# Patient Record
Sex: FEMALE | Race: WHITE | NOT HISPANIC OR LATINO | Employment: UNEMPLOYED | URBAN - METROPOLITAN AREA
[De-identification: names, ages, dates, MRNs, and addresses within clinical notes are randomized per-mention and may not be internally consistent; named-entity substitution may affect disease eponyms.]

---

## 2017-06-19 ENCOUNTER — OFFICE VISIT - RIVER FALLS (OUTPATIENT)
Dept: FAMILY MEDICINE | Facility: CLINIC | Age: 62
End: 2017-06-19

## 2022-02-11 VITALS
BODY MASS INDEX: 31.91 KG/M2 | WEIGHT: 188.8 LBS | HEART RATE: 60 BPM | SYSTOLIC BLOOD PRESSURE: 140 MMHG | TEMPERATURE: 98.4 F | DIASTOLIC BLOOD PRESSURE: 94 MMHG

## 2022-02-16 NOTE — PROGRESS NOTES
Patient:   ANURAG BOOGIE            MRN: [id number]            FIN: [id number]               Age:   61 years     Sex:  Female     :  1955   Associated Diagnoses:   Cat bite   Author:   Trevon Greene PA-C      Visit Information      Date of Service: 2017 01:34 pm  Performing Location: [facility]  Encounter#: [id number]      Primary Care Provider (PCP):  Trevon Greene PA-C    NPI# [id number]   Visit type:  General concerns.    Accompanied by:  No one.    Source of history:  Self.    Referral source:  Not self.    History limitation:  None.       Chief Complaint   2017 1:36 PM CDT    Cat bite her yesterday morning- Left hand swollen, painful and red  (Modified)       History of Present Illness             The patient presents with hand pain.  The location of pain is the left.  The pain is described as aching and throbbing.  The severity of the pain is moderate.  The timing/course of the pain is episodic, fluctuates in intensity and is worsening.  The pain has lasted for 1 day(s).  Bite by own cat. Exclusively indoor cat. Startled the cat. Tetanus up to date. CC above noted and confirmed with the patient. Swelling, warmth and erythema to left hand. Puncture on thumb. .        Review of Systems   Constitutional:  Negative.    Musculoskeletal:  Negative except as documented in history of present illness.    Integumentary:  Negative except as documented in history of present illness.    Neurologic:  Negative.       Health Status   Allergies:    Allergic Reactions (All)  No known allergies      Histories   Past Medical History:    No active or resolved past medical history items have been selected or recorded.   Family History:    No family history items have been selected or recorded.   Procedure history:    No active procedure history items have been selected or recorded.      Physical Examination   Vital Signs   2017 1:36 PM CDT Temperature Tympanic 98.4 DegF    Peripheral Pulse Rate  60 bpm    Pulse Site Radial artery    HR Method Manual    Systolic Blood Pressure 140 mmHg    Diastolic Blood Pressure 94 mmHg  HI    Mean Arterial Pressure 109 mmHg    BP Site Left arm    BP Method Manual      Measurements from flowsheet : Measurements   6/19/2017 1:36 PM CDT    Weight Measured - Standard                188.8 lb     Cardiovascular:          Arterial pulses: Left, Radial, 2+.         Capillary refill: Left, Upper extremity, Within normal limits.    Musculoskeletal:  Normal range of motion, Normal strength, Swelling of left thumb and lateral hand. Vickey pen around area of erythema. Warm to touch. No drainage..    Neurologic:  No focal deficits.       Impression and Plan   Diagnosis     Cat bite ([id number]).     Patient Instructions:       Counseled: Patient, Regarding diagnosis, Regarding medications, Activity, Verbalized understanding.    Orders     Orders   Pharmacy:  Augmentin 875 mg oral tablet (Prescribe): 1 tab(s), PO, q12hr, x 10 day(s), # 20 tab(s), 0 Refill(s), Type: Acute, Pharmacy: [id number], 1 tab(s) po q12 hrs,x10 day(s).     Local cares. Probiotic. Should not extend beyond line by tomorrow or will call and will get into hand ortho.

## 2022-04-04 ENCOUNTER — OFFICE VISIT (OUTPATIENT)
Dept: FAMILY MEDICINE | Facility: CLINIC | Age: 67
End: 2022-04-04
Payer: COMMERCIAL

## 2022-04-04 VITALS
SYSTOLIC BLOOD PRESSURE: 144 MMHG | DIASTOLIC BLOOD PRESSURE: 78 MMHG | OXYGEN SATURATION: 97 % | HEIGHT: 64 IN | TEMPERATURE: 98.2 F | WEIGHT: 204.6 LBS | BODY MASS INDEX: 34.93 KG/M2 | HEART RATE: 78 BPM

## 2022-04-04 DIAGNOSIS — Z12.11 COLON CANCER SCREENING: ICD-10-CM

## 2022-04-04 DIAGNOSIS — R63.5 WEIGHT GAIN: ICD-10-CM

## 2022-04-04 DIAGNOSIS — Z00.00 WELCOME TO MEDICARE PREVENTIVE VISIT: Primary | ICD-10-CM

## 2022-04-04 DIAGNOSIS — Z13.1 SCREENING FOR DIABETES MELLITUS: ICD-10-CM

## 2022-04-04 DIAGNOSIS — Z13.220 LIPID SCREENING: ICD-10-CM

## 2022-04-04 DIAGNOSIS — Z78.0 ASYMPTOMATIC POSTMENOPAUSAL STATUS: ICD-10-CM

## 2022-04-04 DIAGNOSIS — Z12.31 ENCOUNTER FOR SCREENING MAMMOGRAM FOR BREAST CANCER: ICD-10-CM

## 2022-04-04 PROCEDURE — G0402 INITIAL PREVENTIVE EXAM: HCPCS | Performed by: NURSE PRACTITIONER

## 2022-04-04 ASSESSMENT — ACTIVITIES OF DAILY LIVING (ADL): CURRENT_FUNCTION: NO ASSISTANCE NEEDED

## 2022-04-04 NOTE — PROGRESS NOTES
"SUBJECTIVE:   Mary Carmen Villalpando is a 66 year old female who presents for Preventive Visit.    {Patient has been advised of split billing requirements and indicates understanding: Yes  Are you in the first 12 months of your Medicare coverage?  Yes,  Visual Acuity:  Right Eye: 20/20   Left Eye: 20/20  Both Eyes: 20/15    Healthy Habits:     In general, how would you rate your overall health?  Good    Frequency of exercise:  None    Do you usually eat at least 4 servings of fruit and vegetables a day, include whole grains    & fiber and avoid regularly eating high fat or \"junk\" foods?  No    Taking medications regularly:  Yes    Medication side effects:  None    Ability to successfully perform activities of daily living:  No assistance needed    Home Safety:  No safety concerns identified    Hearing Impairment:  No hearing concerns    In the past 6 months, have you been bothered by leaking of urine? Yes    In general, how would you rate your overall mental or emotional health?  Good      PHQ-2 Total Score: 0    Additional concerns today:  No    Do you feel safe in your environment? Yes    Have you ever done Advance Care Planning? (For example, a Health Directive, POLST, or a discussion with a medical provider or your loved ones about your wishes): No, advance care planning information given to patient to review.  Advanced care planning was discussed at today's visit.       Fall risk  Fallen 2 or more times in the past year?: No  Any fall with injury in the past year?: No    Cognitive Screening   1) Repeat 3 items (Leader, Season, Table)    2) Clock draw: NORMAL  3) 3 item recall: Recalls 3 objects  Results: 3 items recalled: COGNITIVE IMPAIRMENT LESS LIKELY    Mini-CogTM Copyright ROGELIO Gautam. Licensed by the author for use in [facility]; reprinted with permission ([email or web address]). All rights reserved.      Do you have sleep apnea, excessive snoring or daytime drowsiness?: no    Reviewed and updated as needed this " "visit by clinical staff   Tobacco  Allergies               Reviewed and updated as needed this visit by Provider                 Social History     Tobacco Use     Smoking status: Former Smoker     Packs/day: 1.00     Years: 40.00     Pack years: 40.00     Types: Cigarettes     Smokeless tobacco: Never Used   Substance Use Topics     Alcohol use: Yes     Comment: Occasionally     If you drink alcohol do you typically have >3 drinks per day or >7 drinks per week? No    Alcohol Use 4/4/2022   Prescreen: >3 drinks/day or >7 drinks/week? No               Current providers sharing in care for this patient include: no other providers other than those listed  Patient Care Team:  Trevon Greene PA as PCP - General (Family Medicine)  Elan Hunt MD    The following health maintenance items are reviewed in Epic and correct as of today:  There are no preventive care reminders to display for this patient.    Declines all vaccines, counseled on options and where they are avail    FHS-7:   Breast CA Risk Assessment (FHS-7) 4/4/2022   Did any of your first-degree relatives have breast or ovarian cancer? Yes   Did any of your relatives have bilateral breast cancer? Unknown   Did any man in your family have breast cancer? No   Did any woman in your family have breast and ovarian cancer? No   Did any woman in your family have breast cancer before age 50 y? No   Do you have 2 or more relatives with breast and/or ovarian cancer? Yes   Do you have 2 or more relatives with breast and/or bowel cancer? No     Mom and mom's mom with breast cancer, Mary Carmen agrees to schedule a mammogram  as above  Pertinent mammograms are reviewed under the imaging tab.    Review of Systems      OBJECTIVE:   BP (!) 144/78 (BP Location: Right arm, Patient Position: Sitting)   Pulse 78   Temp 98.2  F (36.8  C)   Ht 1.619 m (5' 3.75\")   Wt 92.8 kg (204 lb 9.6 oz)   SpO2 97%   BMI 35.40 kg/m   Estimated body mass index is 35.4 kg/m  as calculated " "from the following:    Height as of this encounter: 1.619 m (5' 3.75\").    Weight as of this encounter: 92.8 kg (204 lb 9.6 oz).  Physical Exam  AOx3 NAD , pleasant          ASSESSMENT / PLAN:       ICD-10-CM    1. Welcome to Medicare preventive visit  Z00.00 DX Hip/Pelvis/Spine     *MA Screening Digital Bilateral   2. Asymptomatic postmenopausal status  Z78.0 DX Hip/Pelvis/Spine   3. Encounter for screening mammogram for breast cancer  Z12.31 *MA Screening Digital Bilateral   4. Colon cancer screening  Z12.11 COLOGUARD([employer])   5. Lipid screening  Z13.220 Lipid panel reflex to direct LDL Fasting   6. Screening for diabetes mellitus  Z13.1 Glucose   7. Weight gain  R63.5 TSH with free T4 reflex       Patient has been advised of split billing requirements and indicates understanding: Yes    COUNSELING:  Reviewed preventive health counseling, as reflected in patient instructions       Regular exercise       Healthy diet/nutrition       Vision screening       Hearing screening       Fall risk prevention       Osteoporosis prevention/bone health       Colon cancer screening    Estimated body mass index is 35.4 kg/m  as calculated from the following:    Height as of this encounter: 1.619 m (5' 3.75\").    Weight as of this encounter: 92.8 kg (204 lb 9.6 oz).        She reports that she has quit smoking. Her smoking use included cigarettes. She has a 40.00 pack-year smoking history. She has never used smokeless tobacco.  Tobacco Cessation Action Plan:   quit smoking 2013      Appropriate preventive services were discussed with this patient, including applicable screening as appropriate for cardiovascular disease, diabetes, osteopenia/osteoporosis, and glaucoma.  As appropriate for age/gender, discussed screening for colorectal cancer, prostate cancer, breast cancer, and cervical cancer. Checklist reviewing preventive services available has been given to the patient.    Reviewed patients plan of care and provided " an AVS. The Basic Care Plan (routine screening as documented in Health Maintenance) for Mary Carmen meets the Care Plan requirement. This Care Plan has been established and reviewed with the Patient.    Counseling Resources:  ATP IV Guidelines  Pooled Cohorts Equation Calculator  Breast Cancer Risk Calculator  Breast Cancer: Medication to Reduce Risk  FRAX Risk Assessment  ICSI Preventive Guidelines  Dietary Guidelines for Americans, 2010  USDA's MyPlate  ASA Prophylaxis  Lung CA Screening    Wen Jones NP  [facility]    Identified Health Risks:

## 2022-04-08 RX ORDER — CETIRIZINE HYDROCHLORIDE 10 MG/1
10 TABLET ORAL DAILY
COMMUNITY

## 2022-04-11 ENCOUNTER — TRANSFERRED RECORDS (OUTPATIENT)
Dept: HEALTH INFORMATION MANAGEMENT | Facility: CLINIC | Age: 67
End: 2022-04-11
Payer: COMMERCIAL

## 2022-04-11 DIAGNOSIS — Z78.0 ASYMPTOMATIC MENOPAUSAL STATE: ICD-10-CM

## 2022-04-11 DIAGNOSIS — Z91.89 AT RISK FOR BONE DENSITY LOSS: ICD-10-CM

## 2022-04-11 DIAGNOSIS — R29.6 RECURRENT FALLS: ICD-10-CM

## 2022-04-11 DIAGNOSIS — Z12.11 COLON CANCER SCREENING: Primary | ICD-10-CM

## 2022-04-11 DIAGNOSIS — Z12.31 ENCOUNTER FOR SCREENING MAMMOGRAM FOR BREAST CANCER: ICD-10-CM

## 2022-04-11 LAB — COLOGUARD-ABSTRACT: POSITIVE

## 2022-04-12 ENCOUNTER — LAB (OUTPATIENT)
Dept: LAB | Facility: CLINIC | Age: 67
End: 2022-04-12
Payer: COMMERCIAL

## 2022-04-12 ENCOUNTER — TELEPHONE (OUTPATIENT)
Dept: FAMILY MEDICINE | Facility: CLINIC | Age: 67
End: 2022-04-12

## 2022-04-12 DIAGNOSIS — Z13.1 SCREENING FOR DIABETES MELLITUS: ICD-10-CM

## 2022-04-12 DIAGNOSIS — R63.5 WEIGHT GAIN: ICD-10-CM

## 2022-04-12 DIAGNOSIS — Z13.220 LIPID SCREENING: ICD-10-CM

## 2022-04-12 LAB
CHOLEST SERPL-MCNC: 200 MG/DL
FASTING STATUS PATIENT QL REPORTED: YES
FASTING STATUS PATIENT QL REPORTED: YES
GLUCOSE BLD-MCNC: 97 MG/DL (ref 70–99)
HDLC SERPL-MCNC: 76 MG/DL
LDLC SERPL CALC-MCNC: 105 MG/DL
NONHDLC SERPL-MCNC: 124 MG/DL
TRIGL SERPL-MCNC: 96 MG/DL
TSH SERPL DL<=0.005 MIU/L-ACNC: 2.61 MU/L (ref 0.4–4)

## 2022-04-12 PROCEDURE — 82947 ASSAY GLUCOSE BLOOD QUANT: CPT | Mod: QW | Performed by: NURSE PRACTITIONER

## 2022-04-12 PROCEDURE — 84443 ASSAY THYROID STIM HORMONE: CPT | Performed by: NURSE PRACTITIONER

## 2022-04-12 PROCEDURE — 36415 COLL VENOUS BLD VENIPUNCTURE: CPT | Performed by: NURSE PRACTITIONER

## 2022-04-12 PROCEDURE — 80061 LIPID PANEL: CPT | Performed by: NURSE PRACTITIONER

## 2022-04-12 NOTE — TELEPHONE ENCOUNTER
Reason for call:  Order   Order or referral being requested: Order for dexascan  Reason for request: Discussed at last visit - sent to [facility] and they refused to send orders to [facility] since Pt would prefer to be seen locally.   Date needed: as soon as possible  Has the patient been seen by the PCP for this problem? YES    Additional comments:     Phone number to reach patient:  Cell number on file:    Telephone Information:   Mobile [phone]   Mobile [phone]       Best Time:  Anytime    Can we leave a detailed message on this number?  YES    Travel screening: Not Applicable

## 2022-04-18 ENCOUNTER — TELEPHONE (OUTPATIENT)
Dept: FAMILY MEDICINE | Facility: CLINIC | Age: 67
End: 2022-04-18
Payer: COMMERCIAL

## 2022-04-18 NOTE — TELEPHONE ENCOUNTER
[employer] called (Mi) wanted to inform you This patients Cologuard screening came back w/abnormal results.  FYI, they faxed in the results, but for some reason their faxes (98% of them) come back transmitting error as the pages are completely black and they are not able to be read.

## 2022-04-18 NOTE — TELEPHONE ENCOUNTER
I spoke with pt about her positive COLOGUARD test. She needs a colonoscopy. She has never had a colon oscopy or any colon cancer screening, she has no Family history.  She lives in [place] and would like the colonoscopy done at [facility]. She had a negative experience trying to get blood work drawn locally AND trying to get her mammo and bone density ordered and sent to the [facility].  PLEASE put in orders for COLONOSCOPY dx screening with positive cologuard in the comments AND screening mammo and screening DEXA, all to be sent to the [facility]. Thanks.

## 2022-04-19 NOTE — TELEPHONE ENCOUNTER
Orders faxed, Fax confirmation received. Patient notified of orders being sent and that [facility] will contact her to schedule.

## 2022-04-19 NOTE — TELEPHONE ENCOUNTER
Looks like orders are in. Could you fax it to [facility]? Pt does not want to go though [place].       ARMANDO Bowen

## 2022-05-10 ENCOUNTER — TRANSFERRED RECORDS (OUTPATIENT)
Dept: HEALTH INFORMATION MANAGEMENT | Facility: CLINIC | Age: 67
End: 2022-05-10
Payer: COMMERCIAL

## 2022-05-17 ENCOUNTER — MEDICAL CORRESPONDENCE (OUTPATIENT)
Dept: HEALTH INFORMATION MANAGEMENT | Facility: CLINIC | Age: 67
End: 2022-05-17
Payer: COMMERCIAL

## 2022-05-18 ENCOUNTER — TRANSCRIBE ORDERS (OUTPATIENT)
Dept: OTHER | Age: 67
End: 2022-05-18
Payer: COMMERCIAL

## 2022-05-18 DIAGNOSIS — H53.2 DIPLOPIA: Primary | ICD-10-CM

## 2022-05-23 ENCOUNTER — CARE COORDINATION (OUTPATIENT)
Dept: FAMILY MEDICINE | Facility: CLINIC | Age: 67
End: 2022-05-23
Payer: COMMERCIAL

## 2022-06-06 PROBLEM — C18.9 COLON CANCER (H): Status: ACTIVE | Noted: 2022-06-06

## 2023-04-22 ENCOUNTER — HEALTH MAINTENANCE LETTER (OUTPATIENT)
Age: 68
End: 2023-04-22

## 2023-06-02 ENCOUNTER — HEALTH MAINTENANCE LETTER (OUTPATIENT)
Age: 68
End: 2023-06-02

## 2024-06-21 ENCOUNTER — TRANSFERRED RECORDS (OUTPATIENT)
Dept: HEALTH INFORMATION MANAGEMENT | Facility: CLINIC | Age: 69
End: 2024-06-21

## 2024-07-09 ENCOUNTER — MEDICAL CORRESPONDENCE (OUTPATIENT)
Dept: HEALTH INFORMATION MANAGEMENT | Facility: CLINIC | Age: 69
End: 2024-07-09
Payer: COMMERCIAL

## 2024-07-09 ENCOUNTER — TRANSFERRED RECORDS (OUTPATIENT)
Dept: HEALTH INFORMATION MANAGEMENT | Facility: CLINIC | Age: 69
End: 2024-07-09
Payer: COMMERCIAL

## 2024-07-24 ENCOUNTER — TRANSCRIBE ORDERS (OUTPATIENT)
Dept: OTHER | Age: 69
End: 2024-07-24

## 2024-07-24 DIAGNOSIS — H53.2 DIPLOPIA: Primary | ICD-10-CM

## 2024-08-20 NOTE — PROGRESS NOTES
1. Tiny angle right hypertropia intermittently symptomatic     Slowly progressive diplopia for 2 years with unrevealing recent brain MRI that I reviewed.  Patient has a very small 1 prism diopter right hypertropia and poor fusional amplitudes.  This type of phenomena can result from epiretinal membrane (but OCT macula today excludes this), skew deviation (negative MRI brain and no other features on exam to suggest skew such as nystagmus / saccadic smooth pursuit / etc), or can simply be a age related phenomena attributable to cyclovertical deviation from sagging eye syndrome.  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  No suggestion on exam of thyroid eye disease (nor MRI) and no exam findings to suggest myasthenia gravis.    Patient comfortably fuses 1 BD right eye today with a large window of single binocular vision. I will prescribe this to her in a new pair of ground in prism glasses.    Plan:  -Ground in prism glasses prescription today  -Follow-up with me in 4 months to ensure no change in alignment and patient content with ground in prism glasses    Mary Carmen Villalpando is a pleasant 68 year old White female who presents to my neuro-ophthalmology clinic today having been referred by Dr. Alan for Diplopia    HPI:  68 year old female with history of colon cancer s/p resection (monitored q6 months with CT and labs), HTN, HLD, COPD and tobacco use who developed binocular diplopia while driving. [facility] who referred for evaluation of worsening diplopia.     Mary Carmen reports that about 2 years ago, she noticed oblique double vision at distance only shortly after receiving a new pair of glasses.  She reports that initially, she noticed the double vision sporadically lasting only a few minutes at a time.  This has gradually worsened over the last few months; she now has double vision daily, which lasts longer.    Initially, she  reports that turning her head left seemed to help her double vision.  She noticed it most often while driving.    She denies eyelid drooping or difficulty swallowing.  She does have some shortness of breath from borderline emphysema.  No significant eye pain or redness.  She notes some pressure headaches/eyestrain in the last few weeks.  Previously not a headachey person.  No h/o thyroid issues (normal TSH in 2022, repeated 2024 but cannot see results).    She denies h/o patching or strabismus surgery.  No family members with strabismus.    Independent historians:  Patient    Review of outside testing:  MRI Brain WO 4/29/24:  1.  No acute intracranial abnormality.   2.  Mild presumed chronic small vessel ischemic changes.   3.  Old microhemorrhages in the bilateral lentiform nuclei, nonspecific but most likely hypertensive in etiology.     MRA neck 6/21/24:      My interpretation performed today of outside testing:  I reviewed patient's prior April MR brain images and agree that there is no clear structural cause of her strabismus     Review of outside clinical notes:        Past medical history:    Patient Active Problem List   Diagnosis    Tobacco use disorder    Fx surg nck humerus-closed    Colon cancer (H)   Colon surgery s/p resection 2 years  HTN, HLD, COPD    Patient has a current medication list which includes the following prescription(s): metoprolol succinate er, ascorbic acid, calcium-magnesium-zinc, cetirizine, cholecalciferol, echinacea, multi-vitamin, and tylenol.    Family history / social history:  Patient's family history includes Breast Cancer in her maternal aunt and mother; Diabetes in her maternal uncle; Heart Disease in her maternal grandmother.     Patient  reports that she has quit smoking. Her smoking use included cigarettes. She has a 40 pack-year smoking history. She has never used smokeless tobacco. She reports current alcohol use.     Exam:  Corrected distance visual acuity was 20/20 -1  in the right eye and 20/20 -1 in the left eye. Intraocular pressure was 15 in the right eye and 13 in the left eye using ICare.  Color vision 11/11 right eye and 11/11 left eye.  Pupils isocoric with no APD.   Please see epic chart for complete exam     Anterior segment and fundus exam remarkable for age-related changes.     Sensorimotor exam showed full motility. No nystagmus.  Alignment testing showed orthophoria in all gaze positions on alternate cover testing however single Costa pauly revealed a 1 prism diopter right hyperdeviation particularly in left gaze but also present intermittently in primary gaze.  Double Costa pauly showed no significant torsion smooth pursuit was normal.    OCT macula:  In the right eye- no epiretinal membrane. Vitreomacular adherence  In the left eye- no epiretinal membrane. Vitreomacular adherence       Precharting:  Daren Jain  Resident Physician, PGY-3  Department of Ophthalmology    Jessica Appiah, OD    45 minutes were spent on the date of the encounter by me doing chart review, history and exam, documentation, and further activities as noted above    Complete documentation of historical and exam elements from today's encounter can be found in the full encounter summary report (not reduplicated in this progress note).  I personally obtained the chief complaint(s) and history of present illness.  I confirmed and edited as necessary the review of systems, past medical/surgical history, family history, social history, and examination findings as documented by others; and I examined the patient myself.  I personally reviewed the relevant tests, images, and reports as documented above.  I formulated and edited as necessary the assessment and plan and discussed the findings and management plan with the patient and family     Melvin Farnsworth MD

## 2024-08-22 ENCOUNTER — OFFICE VISIT (OUTPATIENT)
Dept: OPHTHALMOLOGY | Facility: CLINIC | Age: 69
End: 2024-08-22
Attending: OPHTHALMOLOGY
Payer: MEDICARE

## 2024-08-22 DIAGNOSIS — H35.373 EPIRETINAL MEMBRANE (ERM) OF BOTH EYES: Primary | ICD-10-CM

## 2024-08-22 DIAGNOSIS — H53.2 DIPLOPIA: ICD-10-CM

## 2024-08-22 DIAGNOSIS — H50.21 HYPERTROPIA OF RIGHT EYE: ICD-10-CM

## 2024-08-22 PROCEDURE — 92060 SENSORIMOTOR EXAMINATION: CPT | Mod: 26 | Performed by: OPHTHALMOLOGY

## 2024-08-22 PROCEDURE — 99204 OFFICE O/P NEW MOD 45 MIN: CPT | Performed by: OPHTHALMOLOGY

## 2024-08-22 PROCEDURE — G0463 HOSPITAL OUTPT CLINIC VISIT: HCPCS | Performed by: OPHTHALMOLOGY

## 2024-08-22 PROCEDURE — 92060 SENSORIMOTOR EXAMINATION: CPT | Performed by: OPHTHALMOLOGY

## 2024-08-22 PROCEDURE — 92134 CPTRZ OPH DX IMG PST SGM RTA: CPT | Performed by: OPHTHALMOLOGY

## 2024-08-22 RX ORDER — METOPROLOL SUCCINATE 25 MG/1
1 TABLET, EXTENDED RELEASE ORAL DAILY
COMMUNITY
Start: 2024-07-30

## 2024-08-22 ASSESSMENT — CONF VISUAL FIELD
OD_SUPERIOR_TEMPORAL_RESTRICTION: 0
OD_SUPERIOR_NASAL_RESTRICTION: 0
OS_INFERIOR_TEMPORAL_RESTRICTION: 0
OD_INFERIOR_TEMPORAL_RESTRICTION: 0
OD_INFERIOR_NASAL_RESTRICTION: 0
METHOD: COUNTING FINGERS
OS_NORMAL: 1
OS_SUPERIOR_NASAL_RESTRICTION: 0
OS_SUPERIOR_TEMPORAL_RESTRICTION: 0
OD_NORMAL: 1
OS_INFERIOR_NASAL_RESTRICTION: 0

## 2024-08-22 ASSESSMENT — REFRACTION_WEARINGRX
OS_CYLINDER: +0.50
OD_SPHERE: +2.25
OS_AXIS: 105
OD_ADD: +2.25
OS_SPHERE: +2.00
OD_CYLINDER: SPHERE
OS_ADD: +2.25

## 2024-08-22 ASSESSMENT — EXTERNAL EXAM - LEFT EYE: OS_EXAM: NORMAL

## 2024-08-22 ASSESSMENT — EXTERNAL EXAM - RIGHT EYE: OD_EXAM: NORMAL

## 2024-08-22 ASSESSMENT — TONOMETRY
OD_IOP_MMHG: 15
OS_IOP_MMHG: 13
IOP_METHOD: ICARE

## 2024-08-22 ASSESSMENT — VISUAL ACUITY
OD_CC+: -1
OS_CC+: -1
OS_CC: 20/20
OD_CC: 20/20
METHOD: SNELLEN - LINEAR

## 2024-08-22 ASSESSMENT — REFRACTION_FINALRX: OD_HPRISM: 1 BASE DOWN

## 2024-08-22 ASSESSMENT — SLIT LAMP EXAM - LIDS
COMMENTS: NORMAL
COMMENTS: NORMAL

## 2024-08-22 ASSESSMENT — CUP TO DISC RATIO
OD_RATIO: 0.3
OS_RATIO: 0.25

## 2024-08-22 NOTE — LETTER
2024    RE: Mary Carmen Villalpando  : 1955  MRN: [id number]    Dear Dr. Alan,    Thank you for referring your patient, Mary Carmen Villalpando, to my neuro-ophthalmology clinic recently.  After a thorough neuro-ophthalmic history and examination, I came to the following conclusions:   1. Tiny angle right hypertropia intermittently symptomatic.    Slowly progressive diplopia for 2 years with unrevealing recent brain MRI that I reviewed.  Patient has a very small 1 prism diopter right hypertropia and poor fusional amplitudes.  This type of phenomena can result from epiretinal membrane (but OCT macula today excludes this), skew deviation (negative MRI brain and no other features on exam to suggest skew such as nystagmus / saccadic smooth pursuit / etc), or can simply be a age related phenomena attributable to cyclovertical deviation from sagging eye syndrome.  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  No suggestion on exam of thyroid eye disease (nor MRI) and no exam findings to suggest myasthenia gravis.    Patient comfortably fuses 1 BD right eye today with a large window of single binocular vision. I will prescribe this to her in a new pair of ground in prism glasses.    Plan:  -Ground in prism glasses prescription today.  -Follow-up with me in 4 months to ensure no change in alignment and patient content with ground in prism glasses.    Mary Carmen Villalpando is a pleasant 68 year old White female who presents to my neuro-ophthalmology clinic today having been referred by Dr. Alan for Diplopia.    HPI:  68 year old female with history of colon cancer s/p resection (monitored q6 months with CT and labs), HTN, HLD, COPD and tobacco use who developed binocular diplopia while driving. [facility] for evaluation of worsening diplopia.     Mary Carmen reports that about 2 years ago, she noticed oblique double vision at distance only  shortly after receiving a new pair of glasses.  She reports that initially, she noticed the double vision sporadically lasting only a few minutes at a time.  This has gradually worsened over the last few months; she now has double vision daily, which lasts longer.    Initially, she reports that turning her head left seemed to help her double vision.  She noticed it most often while driving.    She denies eyelid drooping or difficulty swallowing.  She does have some shortness of breath from borderline emphysema.  No significant eye pain or redness.  She notes some pressure headaches/eyestrain in the last few weeks.  Previously not a headachey person.  No h/o thyroid issues (normal TSH in 2022, repeated 2024 but cannot see results).    She denies h/o patching or strabismus surgery.  No family members with strabismus.    Independent historians:  Patient    Review of outside testing:  MRI Brain WO 4/29/24:  1.  No acute intracranial abnormality.   2.  Mild presumed chronic small vessel ischemic changes.   3.  Old microhemorrhages in the bilateral lentiform nuclei, nonspecific but most likely hypertensive in etiology.     MRA neck 6/21/24:      My interpretation performed today of outside testing:  I reviewed patient's prior April MR brain images and agree that there is no clear structural cause of her strabismus     Review of outside clinical notes:        Past medical history:    Patient Active Problem List   Diagnosis    Tobacco use disorder    Fx surg nck humerus-closed    Colon cancer (H)   Colon surgery s/p resection 2 years  HTN, HLD, COPD    Patient has a current medication list which includes the following prescription(s): metoprolol succinate er, ascorbic acid, calcium-magnesium-zinc, cetirizine, cholecalciferol, echinacea, multi-vitamin, and tylenol.    Family history / social history:  Patient's family history includes Breast Cancer in her maternal aunt and mother; Diabetes in her maternal uncle; Heart  Disease in her maternal grandmother.     Patient  reports that she has quit smoking. Her smoking use included cigarettes. She has a 40 pack-year smoking history. She has never used smokeless tobacco. She reports current alcohol use.     Exam:  Corrected distance visual acuity was 20/20 -1 in the right eye and 20/20 -1 in the left eye. Intraocular pressure was 15 in the right eye and 13 in the left eye using ICare.  Color vision 11/11 right eye and 11/11 left eye.  Pupils isocoric with no APD.   Please see epic chart for complete exam     Anterior segment and fundus exam remarkable for age-related changes.     Sensorimotor exam showed full motility. No nystagmus.  Alignment testing showed orthophoria in all gaze positions on alternate cover testing however single Costa pauly revealed a 1 prism diopter right hyperdeviation particularly in left gaze but also present intermittently in primary gaze.  Double Costa pauly showed no significant torsion smooth pursuit was normal.    OCT macula:  In the right eye- no epiretinal membrane. Vitreomacular adherence  In the left eye- no epiretinal membrane. Vitreomacular adherence    Again, thank you for trusting me with the care of your patient.  For further exam details, please feel free to contact our office for additional records.  If you wish to contact me regarding this patient please email me at [email or web address] or give my clinic a call to arrange a phone conversation.    Sincerely,    Melvin Farnsworth MD  , Neuro-Ophthalmology and Adult Strabismus Surgery  The Rachael Rodriguez Chair in Neuro-Ophthalmology  Department of Ophthalmology and Visual Neurosciences  [facility]    DX: small angle hypertropia, ground in prism glasses

## 2024-09-07 ENCOUNTER — HEALTH MAINTENANCE LETTER (OUTPATIENT)
Age: 69
End: 2024-09-07